# Patient Record
Sex: FEMALE | Race: ASIAN | NOT HISPANIC OR LATINO | Employment: FULL TIME | URBAN - METROPOLITAN AREA
[De-identification: names, ages, dates, MRNs, and addresses within clinical notes are randomized per-mention and may not be internally consistent; named-entity substitution may affect disease eponyms.]

---

## 2024-06-21 ENCOUNTER — OFFICE VISIT (OUTPATIENT)
Dept: FAMILY MEDICINE CLINIC | Facility: CLINIC | Age: 34
End: 2024-06-21
Payer: COMMERCIAL

## 2024-06-21 VITALS
OXYGEN SATURATION: 99 % | DIASTOLIC BLOOD PRESSURE: 75 MMHG | SYSTOLIC BLOOD PRESSURE: 114 MMHG | BODY MASS INDEX: 27.25 KG/M2 | RESPIRATION RATE: 16 BRPM | HEIGHT: 64 IN | WEIGHT: 159.6 LBS | HEART RATE: 70 BPM

## 2024-06-21 DIAGNOSIS — Z00.00 ANNUAL PHYSICAL EXAM: ICD-10-CM

## 2024-06-21 DIAGNOSIS — G43.109 MIGRAINE WITH AURA AND WITHOUT STATUS MIGRAINOSUS, NOT INTRACTABLE: Primary | ICD-10-CM

## 2024-06-21 DIAGNOSIS — Z11.59 NEED FOR HEPATITIS C SCREENING TEST: ICD-10-CM

## 2024-06-21 DIAGNOSIS — Z23 ENCOUNTER FOR IMMUNIZATION: ICD-10-CM

## 2024-06-21 PROCEDURE — 90715 TDAP VACCINE 7 YRS/> IM: CPT | Performed by: FAMILY MEDICINE

## 2024-06-21 PROCEDURE — 90471 IMMUNIZATION ADMIN: CPT | Performed by: FAMILY MEDICINE

## 2024-06-21 PROCEDURE — 99385 PREV VISIT NEW AGE 18-39: CPT | Performed by: FAMILY MEDICINE

## 2024-06-21 PROCEDURE — 99214 OFFICE O/P EST MOD 30 MIN: CPT | Performed by: FAMILY MEDICINE

## 2024-06-21 RX ORDER — SUMATRIPTAN 50 MG/1
TABLET, FILM COATED ORAL
Qty: 6 TABLET | Refills: 0 | Status: SHIPPED | OUTPATIENT
Start: 2024-06-21

## 2024-06-21 NOTE — PROGRESS NOTES
Chief Complaint  Chief Complaint   Patient presents with    Establish Care     Needs a PCP    Migraine       Subjective    History of Present Illness  Rogelio Rubi is a 33 y.o. female presents to [facility] to establish care.  Occupation: teacher at [employer]  Hobbies: has fun with kids, drawing  Diet: Generally balanced  Physical activity: On feet all day at work  Support system: sweet , her children (11 and 6), no family nearby  Sleep: Good, gets 8 hours of sleep, feels rested when she wake sup, she does snore.   Mood: Good, sometimes feels cranky when she gets a migraine.   Health goals: Has migraine with aura, frequency currently at 2x monthly. No triggers identified. She takes ibuprofen and lays down in a dark quiet room when possible when she gets a migraine, has not had to call into work, no prescription meds.       No current outpatient medications on file prior to visit.     No current facility-administered medications on file prior to visit.       Patient Active Problem List   Diagnosis    Migraine with aura and without status migrainosus, not intractable       Past Medical History:   Diagnosis Date    Bone tuberculosis 2016       Past Surgical History:   Procedure Laterality Date    BONE RESECTION  2016    right clavicle for TB       Family History   Problem Relation Age of Onset    Diabetes Father        Health Maintenance Summary            Ordered - HEPATITIS C SCREENING (Once) Ordered on 6/21/2024      No completion, postpone, or frequency change history exists for this topic.              Postponed - COVID-19 Vaccine (1 - 2023-24 season) Postponed until 9/10/2024      06/21/2024  Postponed until 9/10/2024 by Lisa Mcdowell MD (Product Unavailable)              INFLUENZA VACCINE (Yearly - August to March) Next due on 8/1/2024      No completion, postpone, or frequency change history exists for this topic.              PAP SMEAR (Every 3 Years) Next due on  "6/15/2025      06/15/2022  Patient-Reported (Performed Externally)              ANNUAL PHYSICAL (Yearly) Next due on 6/21/2025 06/21/2024  Done              BMI FOLLOWUP (Yearly) Next due on 6/21/2025 06/21/2024  Postponed until 6/22/2024 by Lisa Mcdowell MD (Pending event)    06/21/2024  SmartData: BMI EDUCATION FOR OVERWEIGHT              TDAP/TD VACCINES (2 - Td or Tdap) Next due on 6/21/2034 06/21/2024  Imm Admin: Tdap              Pneumococcal Vaccine 0-64 (Series Information) Aged Out      No completion, postpone, or frequency change history exists for this topic.                       Objective   Vitals:    06/21/24 1254   BP: 114/75   BP Location: Right arm   Patient Position: Sitting   Cuff Size: Adult   Pulse: 70   Resp: 16   SpO2: 99%   Weight: 72.4 kg (159 lb 9.6 oz)   Height: 162.6 cm (64\")        BMI is >= 25 and <30. (Overweight) The following options were offered after discussion;: exercise counseling/recommendations and nutrition counseling/recommendations       Physical Exam  Vitals reviewed.   Constitutional:       General: She is not in acute distress.     Appearance: Normal appearance.   HENT:      Head: Normocephalic and atraumatic.      Right Ear: Tympanic membrane, ear canal and external ear normal.      Left Ear: Tympanic membrane, ear canal and external ear normal.      Nose: Nose normal. No rhinorrhea.      Mouth/Throat:      Mouth: Mucous membranes are moist.      Pharynx: No posterior oropharyngeal erythema.   Eyes:      Extraocular Movements: Extraocular movements intact.      Conjunctiva/sclera: Conjunctivae normal.      Pupils: Pupils are equal, round, and reactive to light.   Neck:      Comments: No thyromegaly or thyroid nodule on palpation  Cardiovascular:      Rate and Rhythm: Normal rate and regular rhythm.      Pulses: Normal pulses.      Heart sounds: Normal heart sounds. No murmur heard.  Pulmonary:      Effort: Pulmonary effort is normal.      Breath sounds: " Normal breath sounds. No wheezing.   Abdominal:      General: Bowel sounds are normal. There is no distension.      Palpations: Abdomen is soft.      Tenderness: There is no abdominal tenderness.   Musculoskeletal:         General: No tenderness or deformity. Normal range of motion.      Cervical back: Normal range of motion and neck supple.   Lymphadenopathy:      Cervical: No cervical adenopathy.   Skin:     General: Skin is warm and dry.      Capillary Refill: Capillary refill takes less than 2 seconds.      Findings: No lesion or rash.   Neurological:      General: No focal deficit present.      Mental Status: She is alert and oriented to person, place, and time.      Gait: Gait normal.      Deep Tendon Reflexes: Reflexes normal.   Psychiatric:         Mood and Affect: Mood normal.         Behavior: Behavior normal.         Judgment: Judgment normal.          PHQ-9 Depression Screening  Little interest or pleasure in doing things? 0-->not at all   Feeling down, depressed, or hopeless? 0-->not at all   Trouble falling or staying asleep, or sleeping too much?     Feeling tired or having little energy?     Poor appetite or overeating?     Feeling bad about yourself - or that you are a failure or have let yourself or your family down?     Trouble concentrating on things, such as reading the newspaper or watching television?     Moving or speaking so slowly that other people could have noticed? Or the opposite - being so fidgety or restless that you have been moving around a lot more than usual?     Thoughts that you would be better off dead, or of hurting yourself in some way?     PHQ-9 Total Score 0   If you checked off any problems, how difficult have these problems made it for you to do your work, take care of things at home, or get along with other people?       Assessment and Plan  Laurelmayrapatricio Rubi is a 33 y.o. female presents to [facility] today to establish care and discuss  health goals/concerns, chart reviewed and updated, medications reviewed, labs reviewed, preventative med reviewed. Patient has not been seen by primary care for annual exam in the last 12 months.. Answered all questions at this time, pt expressed no further concerns today.     Preventative medicine:   Eye exam: Not up to date.  Encouraged annual eye exam  Dental exam: Not up to date.  Encouraged annual dental exam  BP: normal  Lipid Panel :   Ordered    A1c:  ordered    Hep C screening: Ordered  Colon cancer screening UTD- age 45-75: N/A  Lung cancer screening UTD- age 50-75: N/A  Immunizations UTD: Yes  Anxiety/depression screening: normal  Tobacco cessation counseling: N/A    Women:   Pap age 21-65: Not up to date.  Patient to schedule with PCP  Mammogram age 40-75:  N/A    Osteoporosis Screen age 65:  N/A      Diagnoses and all orders for this visit:    1. Migraine with aura and without status migrainosus, not intractable (Primary)  Overview:  Chronic migraine with aura. Frequency currently at 2/month  - No red flag symptoms at this time for advanced imaging.   - encouraged pt to keep headache diary to identify potential triggers  - discussed nonpharmacologic strategies: yoga, stretching, journaling, heat, epsom salt bath  - cont NSAIDs as first line treatment as this has proven effective.   - abortive: Imitrex 50mg used at start of HA; (MAX 2/DAY 8/MONTH)  - Discussed use of B2 or magnesium 400mg daily for prophylaxis; requires 3-4 mo for improvement to be felt.   - can make appt for acupuncture    Orders:  -     CBC & Differential  -     Comprehensive Metabolic Panel  -     Lipid Panel  -     Hemoglobin A1c  -     TSH Rfx On Abnormal To Free T4  -     SUMAtriptan (Imitrex) 50 MG tablet; Take one tablet at onset of headache. May repeat dose one time in 2 hours if headache not relieved.  Indications: Migraine Headache  Dispense: 6 tablet; Refill: 0    2. Need for hepatitis C screening test  -     Hepatitis C  Antibody    3. Encounter for immunization  -     Tdap Vaccine Greater Than or Equal To 8yo IM    4. Annual physical exam        Patient voiced understanding and agreement with plan of care and had no further questions or concerns at this time.     Problems addressed:  established problem: inadequately controlled,  Complexity: labs ordered yes  Risk: prescription drug management    Lisa Mcdowell MD  Family Medicine  [facility]    Follow Up  Return in about 6 weeks (around 8/2/2024) for well woman.    Patient Instructions   Today: Update screening labs and vaccinations.  Schedule annual dental and eye exam.    Meds: Imitrex 50mg at onset of migraine; can repeat in 2 hrs if needed. Max 100mg/24hrs    Healthy lifestyle tips  - Reduce intake of processed food (shop perimeter of grocery store), especially white flour and sugar  - Increase intake of fruits/veggies  - Drink 32-64oz of water a day  - Quit smoking if you smoke  - Drink no more than 2 alcoholic beverages/day if you are male, no more than 1 alcoholic beverage/day if you are female  - Get 6-8 hours of restful sleep at night- poor sleep quality has been linked to increased risk of cardiovascular disease  - Voluntary physical activity cumulative 120-150 minutes/week  - Stress management utilizing meditation and mindfulness (InsightTimer, free asmita)  - Keep blood pressure < 140/90    Heart healthy diet from AHA: https://www.heart.org/en/healthy-living/healthy-eating/eat-smart/nutrition-basics/aha-diet-and-lifestyle-recommendations    Migraine plan:  - cont ibuprofen/tylenol as these are first line  - keep headache diary to identify potential triggers as assess response to treatment- Migraine Asaf is a free asmita that can convert headache log to a pdf for your chart  -  nonpharmacologic strategies: yoga, stretching, journaling, heat, epsom salt bath  - may make appt for acupuncture with Dr. Mcdowell- Tuesdays, cash pay  - migraine abortive: sumatriptan  (Imitrex)  - You may consider vitamin B2 400mg or magnesium oxide 250-500mg daily for prophylaxis; requires 3-4 mo for improvement to be felt.

## 2024-06-21 NOTE — PATIENT INSTRUCTIONS
Today: Update screening labs and vaccinations.  Schedule annual dental and eye exam.    Meds: Imitrex 50mg at onset of migraine; can repeat in 2 hrs if needed. Max 100mg/24hrs    Healthy lifestyle tips  - Reduce intake of processed food (shop perimeter of grocery store), especially white flour and sugar  - Increase intake of fruits/veggies  - Drink 32-64oz of water a day  - Quit smoking if you smoke  - Drink no more than 2 alcoholic beverages/day if you are male, no more than 1 alcoholic beverage/day if you are female  - Get 6-8 hours of restful sleep at night- poor sleep quality has been linked to increased risk of cardiovascular disease  - Voluntary physical activity cumulative 120-150 minutes/week  - Stress management utilizing meditation and mindfulness (InsightTimer, free asmita)  - Keep blood pressure < 140/90    Heart healthy diet from AHA: https://www.heart.org/en/healthy-living/healthy-eating/eat-smart/nutrition-basics/aha-diet-and-lifestyle-recommendations    Migraine plan:  - cont ibuprofen/tylenol as these are first line  - keep headache diary to identify potential triggers as assess response to treatment- Migraine Asaf is a free asmita that can convert headache log to a pdf for your chart  -  nonpharmacologic strategies: yoga, stretching, journaling, heat, epsom salt bath  - may make appt for acupuncture with Dr. Mcdowell- Tuesdays, cash pay  - migraine abortive: sumatriptan (Imitrex)  - You may consider vitamin B2 400mg or magnesium oxide 250-500mg daily for prophylaxis; requires 3-4 mo for improvement to be felt.

## 2024-06-22 LAB
ALBUMIN SERPL-MCNC: 4.2 G/DL (ref 3.9–4.9)
ALP SERPL-CCNC: 79 IU/L (ref 44–121)
ALT SERPL-CCNC: 11 IU/L (ref 0–32)
AST SERPL-CCNC: 19 IU/L (ref 0–40)
BASOPHILS # BLD AUTO: 0.1 X10E3/UL (ref 0–0.2)
BASOPHILS NFR BLD AUTO: 1 %
BILIRUB SERPL-MCNC: 1 MG/DL (ref 0–1.2)
BUN SERPL-MCNC: 9 MG/DL (ref 6–20)
BUN/CREAT SERPL: 15 (ref 9–23)
CALCIUM SERPL-MCNC: 9.3 MG/DL (ref 8.7–10.2)
CHLORIDE SERPL-SCNC: 102 MMOL/L (ref 96–106)
CHOLEST SERPL-MCNC: 140 MG/DL (ref 100–199)
CO2 SERPL-SCNC: 25 MMOL/L (ref 20–29)
CREAT SERPL-MCNC: 0.6 MG/DL (ref 0.57–1)
EGFRCR SERPLBLD CKD-EPI 2021: 120 ML/MIN/1.73
EOSINOPHIL # BLD AUTO: 0.3 X10E3/UL (ref 0–0.4)
EOSINOPHIL NFR BLD AUTO: 3 %
ERYTHROCYTE [DISTWIDTH] IN BLOOD BY AUTOMATED COUNT: 12 % (ref 11.7–15.4)
GLOBULIN SER CALC-MCNC: 3.2 G/DL (ref 1.5–4.5)
GLUCOSE SERPL-MCNC: 100 MG/DL (ref 70–99)
HBA1C MFR BLD: 6.1 % (ref 4.8–5.6)
HCT VFR BLD AUTO: 33.2 % (ref 34–46.6)
HCV IGG SERPL QL IA: NON REACTIVE
HDLC SERPL-MCNC: 40 MG/DL
HGB BLD-MCNC: 10.5 G/DL (ref 11.1–15.9)
IMM GRANULOCYTES # BLD AUTO: 0 X10E3/UL (ref 0–0.1)
IMM GRANULOCYTES NFR BLD AUTO: 0 %
LDLC SERPL CALC-MCNC: 87 MG/DL (ref 0–99)
LYMPHOCYTES # BLD AUTO: 2.5 X10E3/UL (ref 0.7–3.1)
LYMPHOCYTES NFR BLD AUTO: 28 %
MCH RBC QN AUTO: 28 PG (ref 26.6–33)
MCHC RBC AUTO-ENTMCNC: 31.6 G/DL (ref 31.5–35.7)
MCV RBC AUTO: 89 FL (ref 79–97)
MONOCYTES # BLD AUTO: 0.5 X10E3/UL (ref 0.1–0.9)
MONOCYTES NFR BLD AUTO: 5 %
NEUTROPHILS # BLD AUTO: 5.5 X10E3/UL (ref 1.4–7)
NEUTROPHILS NFR BLD AUTO: 63 %
PLATELET # BLD AUTO: 407 X10E3/UL (ref 150–450)
POTASSIUM SERPL-SCNC: 4.4 MMOL/L (ref 3.5–5.2)
PROT SERPL-MCNC: 7.4 G/DL (ref 6–8.5)
RBC # BLD AUTO: 3.75 X10E6/UL (ref 3.77–5.28)
SODIUM SERPL-SCNC: 138 MMOL/L (ref 134–144)
TRIGL SERPL-MCNC: 64 MG/DL (ref 0–149)
TSH SERPL DL<=0.005 MIU/L-ACNC: 1.98 UIU/ML (ref 0.45–4.5)
VLDLC SERPL CALC-MCNC: 13 MG/DL (ref 5–40)
WBC # BLD AUTO: 8.9 X10E3/UL (ref 3.4–10.8)

## 2024-06-24 NOTE — PROGRESS NOTES
Please call with the following information. Thank you!    Hello!    Here are the results of your most recent labs:    Your Hep C antibody, Cholesterol, Comprehensive Metabolic Panel, and Thyroid Function was all normal.     Your Blood Sugar, CBC, and Hgb A1C was abnormal.     Your CBC indicated you are anemic.  I recommend oral iron supplementation for 12-16 weeks then take a women's multivitamin or prenatal with iron in it.. You can get OTC iron 325mg (the bottle may say 65mg elemental iron), any brand is ok, and take one every day. I recommend taking iron with a vitamin C containing beverage such as orange juice to enhance absorption. If possible, avoid dairy, Tums, and coffee/tea around when you take iron as they may decrease absorption. Common side effects of iron supplementation include some stomach upset and constipation. Be sure to drink enough water and eat enough fiber when taking iron, you may use miralax if needed for constipation. Your poop may be dark green while taking iron, that is normal. You should notice an improvement within 6-8 weeks of daily iron supplementation as it takes a bit for the body to rebuild the stores it needs.       Your blood sugar was elevated and your A1c was in the prediabetic range at 6.1; if it goes over 6.5 you will have type 2 diabetes. I recommend decreasing carbohydrate intake to 150-200 g/day, reducing processed food, increasing fruit and vegetable intake, at least 120 minutes of physical activity per week as tolerated, and controlling blood pressure with a goal of less than 120/80. We can start a medication called Metformin to help control your blood sugar, let me know if you would like that ordered.     Please continue your current medications.  Please contact me with any questions.    Thank you!  Dr. Mcdowell

## 2024-07-18 DIAGNOSIS — G43.109 MIGRAINE WITH AURA AND WITHOUT STATUS MIGRAINOSUS, NOT INTRACTABLE: ICD-10-CM

## 2024-07-18 RX ORDER — SUMATRIPTAN 50 MG/1
TABLET, FILM COATED ORAL
Qty: 6 TABLET | Refills: 0 | Status: SHIPPED | OUTPATIENT
Start: 2024-07-18

## 2024-08-19 ENCOUNTER — OFFICE VISIT (OUTPATIENT)
Dept: FAMILY MEDICINE CLINIC | Facility: CLINIC | Age: 34
End: 2024-08-19
Payer: COMMERCIAL

## 2024-08-19 VITALS
SYSTOLIC BLOOD PRESSURE: 117 MMHG | OXYGEN SATURATION: 99 % | HEIGHT: 64 IN | DIASTOLIC BLOOD PRESSURE: 74 MMHG | BODY MASS INDEX: 28 KG/M2 | HEART RATE: 63 BPM | WEIGHT: 164 LBS

## 2024-08-19 DIAGNOSIS — G43.109 MIGRAINE WITH AURA AND WITHOUT STATUS MIGRAINOSUS, NOT INTRACTABLE: ICD-10-CM

## 2024-08-19 DIAGNOSIS — R73.03 PREDIABETES: ICD-10-CM

## 2024-08-19 DIAGNOSIS — Z01.419 ENCOUNTER FOR ROUTINE GYNECOLOGICAL EXAMINATION WITH PAPANICOLAOU SMEAR OF CERVIX: Primary | ICD-10-CM

## 2024-08-19 PROCEDURE — 99395 PREV VISIT EST AGE 18-39: CPT | Performed by: FAMILY MEDICINE

## 2024-08-19 PROCEDURE — 99214 OFFICE O/P EST MOD 30 MIN: CPT | Performed by: FAMILY MEDICINE

## 2024-08-19 PROCEDURE — 99459 PELVIC EXAMINATION: CPT | Performed by: FAMILY MEDICINE

## 2024-08-19 RX ORDER — COPPER 313.4 MG/1
1 INTRAUTERINE DEVICE INTRAUTERINE ONCE
COMMUNITY

## 2024-08-19 NOTE — PROGRESS NOTES
Chief Complaint  Chief Complaint   Patient presents with    Annual Exam     pap       Subjective    History of Present Illness  Rogelio Rubi is a 33 y.o. female presents to [facility] for well woman exam.    History of Present Illness  She reports no current health concerns. Her diet is rich in fiber and low in carbohydrates. She has been taking iron supplements, which have improved her energy levels.     She has used Imitrex once for migraines and found it effective.    She is sexually active with her  and uses a copper IUD for contraception, inserted in 2020. She has had two pregnancies, both resulting in normal vaginal deliveries without complications. She reports no urinary incontinence, pelvic pain, dysuria, or abnormal vaginal discharge. She does not perform breast self-examinations but reports no abnormalities in her breasts. Her last menstrual period was from 08/01/2024 to 08/05/2024, and her cycles are regular, with moderate flow and mild headaches. She has never had an abnormal Pap smear.    She declines the influenza and COVID-19 vaccines.    SOCIAL HISTORY  She works in a .    FAMILY HISTORY  She denies any family history of breast cancer, ovarian cancer, high blood pressure, stroke, or heart attack.    IMMUNIZATIONS  She received COVID-19 and flu vaccine last year.        Current Outpatient Medications on File Prior to Visit   Medication Sig Dispense Refill    Paragard Intrauterine Copper intrauterine device IUD To be inserted one time by prescriber. Route intrauterine.  Inserted 2030      SUMAtriptan (IMITREX) 50 MG tablet TAKE ONE TABLET AT ONSET OF HEADACHE. MAY REPEAT DOSE ONE TIME IN 2 HOURS IF HEADACHE NOT RELIEVED. INDICATIONS: MIGRAINE HEADACHE (Patient not taking: Reported on 8/19/2024) 6 tablet 0     No current facility-administered medications on file prior to visit.       Patient Active Problem List   Diagnosis    Migraine with aura and  "without status migrainosus, not intractable    Prediabetes       Past Medical History:   Diagnosis Date    Bone tuberculosis 2016       Past Surgical History:   Procedure Laterality Date    BONE RESECTION  2016    right clavicle for TB       Family History   Problem Relation Age of Onset    Diabetes Father     Breast cancer Neg Hx     Ovarian cancer Neg Hx     Hypertension Neg Hx     Stroke Neg Hx        Health Maintenance Summary            Postponed - COVID-19 Vaccine (1 - 2023-24 season) Postponed until 9/10/2024      06/21/2024  Postponed until 9/10/2024 by Lisa Mcdowell MD (Product Unavailable)              Postponed - INFLUENZA VACCINE (Yearly - August to March) Postponed until 3/31/2025      08/19/2024  Postponed until 3/31/2025 by Lisa Mcdowell MD (Product Unavailable)              Ordered - PAP SMEAR (Every 3 Years) Ordered on 8/19/2024      06/15/2022  Patient-Reported (Performed Externally)              ANNUAL PHYSICAL (Yearly) Next due on 6/21/2025 06/21/2024  Done              BMI FOLLOWUP (Yearly) Next due on 6/21/2025 06/21/2024  Postponed until 6/22/2024 by Lisa Mcdowell MD (Pending event)    06/21/2024  SmartData: BMI EDUCATION FOR OVERWEIGHT    06/21/2024  SmartData: WORKFLOW - QUALITY MEASUREMENT - DOCUMENTED WEIGHT FOLLOW-UP PLAN              TDAP/TD VACCINES (2 - Td or Tdap) Next due on 6/21/2034 06/21/2024  Imm Admin: Tdap              HEPATITIS C SCREENING  Completed      06/21/2024  Hep C Virus Ab component of Hepatitis C Antibody              Pneumococcal Vaccine 0-64 (Series Information) Aged Out      No completion, postpone, or frequency change history exists for this topic.                     Objective   Vitals:    08/19/24 1327   BP: 117/74   Pulse: 63   SpO2: 99%   Weight: 74.4 kg (164 lb)   Height: 162.6 cm (64.02\")                Physical exam  HEENT normal. PERRLA. MMM. Neck supple. Normal respiratory effort. Well perfused. Abdomen soft. Extremities show no edema, " normal range of motion. Normal gait, no focal neurological findings.    BREAST EXAM: risk and benefit of breast self-exam was discussed, not examined    PELVIC EXAM: normal external genitalia, vulva, vagina, cervix.  ThinPrep Pap sample collected.  Exam chaperoned by medical assistant.    The following data was reviewed by: Lisa Mcdowell MD on 08/19/2024:  CMP          6/21/2024    13:46   CMP   Glucose 100    BUN 9    Creatinine 0.60    Sodium 138    Potassium 4.4    Chloride 102    Calcium 9.3    Total Protein 7.4    Albumin 4.2    Globulin 3.2    Total Bilirubin 1.0    Alkaline Phosphatase 79    AST (SGOT) 19    ALT (SGPT) 11    BUN/Creatinine Ratio 15      CBC          6/21/2024    13:46   CBC   WBC 8.9    RBC 3.75    Hemoglobin 10.5    Hematocrit 33.2    MCV 89    MCH 28.0    MCHC 31.6    RDW 12.0    Platelets 407      Lipid Panel          6/21/2024    13:46   Lipid Panel   Total Cholesterol 140    Triglycerides 64    HDL Cholesterol 40    VLDL Cholesterol 13    LDL Cholesterol  87      TSH          6/21/2024    13:46   TSH   TSH 1.980      Most Recent A1C          6/21/2024    13:46   HGBA1C Most Recent   Hemoglobin A1C 6.1      Last primary care note      Assessment and Plan  Rogelio Rubi is a 33 y.o. female presents to [facility] today for well woman, chart reviewed and updated, medications reviewed, labs reviewed, preventative med reviewed. Pt is currently doing well, no concerning findings on history or exam. Answered all questions at this time, pt expressed no further concerns today.      Preventative medicine:   Pap: Up to date.   Next pap due in 5 years in accordance with ASCCP guidelines if normal today.   Mammogram:  N/A    Osteoporosis Screen:  N/A      Diagnoses and all orders for this visit:    1. Encounter for routine gynecological examination with Papanicolaou smear of cervix (Primary)  -     IGP, Aptima HPV, Rfx 16 / 18,45    2. Prediabetes    3. Migraine  with aura and without status migrainosus, not intractable  Overview:  Chronic migraine with aura. Frequency currently at 2/month  - No red flag symptoms at this time for advanced imaging.   - encouraged pt to keep headache diary to identify potential triggers  - discussed nonpharmacologic strategies: yoga, stretching, journaling, heat, epsom salt bath  - cont NSAIDs as first line treatment as this has proven effective.   - abortive: Imitrex 50mg used at start of HA; (MAX 2/DAY 8/MONTH)  - Discussed use of B2 or magnesium 400mg daily for prophylaxis; requires 3-4 mo for improvement to be felt.   - can make appt for acupuncture          Patient voiced understanding and agreement with plan of care and had no further questions or concerns at this time.       Problems addressed: 2 or more stable chronic illnesses (MODERATE)   Complexity: labs ordered yes, labs reviewed yes    Lisa Mcdowell MD  Family Medicine  [facility]

## 2024-08-22 LAB
CYTOLOGIST CVX/VAG CYTO: NORMAL
CYTOLOGY CVX/VAG DOC CYTO: NORMAL
CYTOLOGY CVX/VAG DOC THIN PREP: NORMAL
DX ICD CODE: NORMAL
HPV GENOTYPE REFLEX: NORMAL
HPV I/H RISK 4 DNA CVX QL PROBE+SIG AMP: NEGATIVE
Lab: NORMAL
OTHER STN SPEC: NORMAL
STAT OF ADQ CVX/VAG CYTO-IMP: NORMAL